# Patient Record
Sex: FEMALE | Race: WHITE | NOT HISPANIC OR LATINO | URBAN - METROPOLITAN AREA
[De-identification: names, ages, dates, MRNs, and addresses within clinical notes are randomized per-mention and may not be internally consistent; named-entity substitution may affect disease eponyms.]

---

## 2020-02-11 NOTE — PATIENT DISCUSSION
Good postoperative appearance. Quality 111:Pneumonia Vaccination Status For Older Adults: Pneumococcal Vaccination Previously Received Detail Level: Detailed Quality 110: Preventive Care And Screening: Influenza Immunization: Influenza Immunization previously received during influenza season

## 2021-01-27 ENCOUNTER — IMPORTED ENCOUNTER (OUTPATIENT)
Dept: URBAN - METROPOLITAN AREA CLINIC 50 | Facility: CLINIC | Age: 56
End: 2021-01-27

## 2021-02-12 ENCOUNTER — IMPORTED ENCOUNTER (OUTPATIENT)
Dept: URBAN - METROPOLITAN AREA CLINIC 50 | Facility: CLINIC | Age: 56
End: 2021-02-12

## 2021-03-04 ENCOUNTER — IMPORTED ENCOUNTER (OUTPATIENT)
Dept: URBAN - METROPOLITAN AREA CLINIC 50 | Facility: CLINIC | Age: 56
End: 2021-03-04

## 2021-04-17 ASSESSMENT — VISUAL ACUITY
OS_CC: J1@ 14 IN
OD_CC: 20/20
OD_CC: 20/25
OS_CC: J2
OD_CC: J1@ 14 IN
OS_CC: 20/40
OD_CC: J2
OS_CC: 20/30+2

## 2021-04-17 ASSESSMENT — TONOMETRY
OD_IOP_MMHG: 16
OS_IOP_MMHG: 15

## 2021-09-13 NOTE — PATIENT DISCUSSION
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 Complex Repair Preamble Text (Leave Blank If You Do Not Want): Extensive wide undermining was performed.
